# Patient Record
Sex: MALE | Race: WHITE | NOT HISPANIC OR LATINO | Employment: OTHER | URBAN - METROPOLITAN AREA
[De-identification: names, ages, dates, MRNs, and addresses within clinical notes are randomized per-mention and may not be internally consistent; named-entity substitution may affect disease eponyms.]

---

## 2018-10-02 ENCOUNTER — TRANSFERRED RECORDS (OUTPATIENT)
Dept: HEALTH INFORMATION MANAGEMENT | Facility: CLINIC | Age: 63
End: 2018-10-02

## 2018-10-05 ENCOUNTER — TRANSFERRED RECORDS (OUTPATIENT)
Dept: HEALTH INFORMATION MANAGEMENT | Facility: CLINIC | Age: 63
End: 2018-10-05

## 2018-10-10 ENCOUNTER — TRANSFERRED RECORDS (OUTPATIENT)
Dept: HEALTH INFORMATION MANAGEMENT | Facility: CLINIC | Age: 63
End: 2018-10-10

## 2018-10-11 ENCOUNTER — TRANSFERRED RECORDS (OUTPATIENT)
Dept: HEALTH INFORMATION MANAGEMENT | Facility: CLINIC | Age: 63
End: 2018-10-11

## 2018-10-15 ENCOUNTER — TRANSFERRED RECORDS (OUTPATIENT)
Dept: HEALTH INFORMATION MANAGEMENT | Facility: CLINIC | Age: 63
End: 2018-10-15

## 2018-10-15 ENCOUNTER — PRE VISIT (OUTPATIENT)
Dept: RADIATION ONCOLOGY | Facility: CLINIC | Age: 63
End: 2018-10-15

## 2018-10-15 NOTE — TELEPHONE ENCOUNTER
Date of appointment: 10/16/18   Diagnosis/reason for appointment:Rectal Cancer  Referring provider/facility: Dr. Jiménez  Who called: Pool message    Recent Studies  Imaging:-CT -[facility]-requested  Pathology: [facility]  Labs:  Previous radiation (if known):No    Records requested from:  Records received from:    Additional information:

## 2018-10-15 NOTE — PROGRESS NOTES
RADIATION ONCOLOGY CONSULT NOTE    Date of Visit: Oct 16, 2018  Patient Name: Cecil Mejia  MRN: [id number]  : 1955    Cecil Mejia is being seen today for initial consultation at the request of Dr. Amado Jiménez for consideration of radiation therapy.    HISTORY OF PRESENT ILLNESS:  Mr. Mejia is a 63 year old male with rectal cancer.    Home cologuard test was positive.    10/2/18: Screening colonoscopy showed a circumferential friable mass in the rectosigmoid colon, 13 cm from the anal verge. Biopsy showed moderately differentiated adenocarcinoma, MMR intact. There was also a tubular adenoma that was completely resected.    10/5/18: CT c/a/p showed no metastatic disease, several small sub-cm pulmonary nodules.    10/11/18: EUS showed a 3 cm mass, from 12-15 cm from the anal verge, extending through the muscularis propria, with 2 8x5 mm oval hypoechoic LN adjacent to the tumor; uT3N1b.    MRI pelvis showed a 6 cm mass located 13 cm from the anal verge, extending through the muscularis propria into the mesorectal fat, with a few mesorectal LN measuring up to 6 mm.    10/12/18: He was seen by Dr. Stokes in surgical oncology, who discussed management with neoadjuvant chemoradiation followed by likely low anterior resection and systemic chemotherapy. She also discussed a diverting ileostomy at the time of surgery.    10/15/18: He was seen by Dr. Jiménez at [facility], who discussed treatment with capecitabine during pelvic radiation, and adjuvant FOLFOX after surgery.    Today, he states he is doing quite well. He sometimes has constipation and takes OTC stool softeners for this as needed. He otherwise denies any symptoms, including pain with BM, hematochezia, small stool caliber, or tenesmus. He has a good appetite and energy level, he has no weight loss. He is retired and cares for his wife who is in a wheelchair due to transverse myelitis.    CHEMOTHERAPY HISTORY: none    RADIATION  "THERAPY HISTORY:  none    PAST MEDICAL/SURGICAL HISTORY:  Past Medical History:   Diagnosis Date     Hypertension      Rectal cancer (H) 10/02/2018     Past Surgical History:   Procedure Laterality Date     BIOPSY  10/02/2018    Colon, Rectosigmoid, Mass Biopsy     COLONOSCOPY  10/02/2018     LEFT ARM SURGERY RELATED TO BREAK Left 1995       ALLERGIES:  Allergies as of 10/16/2018 - Hakeem as Reviewed 10/16/2018   Allergen Reaction Noted     Lisinopril Cough 03/11/2016       MEDICATIONS:  Current Outpatient Prescriptions   Medication Sig Dispense Refill     amLODIPine (NORVASC) 10 MG tablet Take 10 mg by mouth       losartan (COZAAR) 100 MG tablet Take 100 mg by mouth       Multiple Vitamin (MULTI-VITAMINS) TABS Take 3 tablets by mouth          FAMILY HISTORY:  Family History   Problem Relation Age of Onset     Cancer Father      Multiple Myeloma     Lymphoma Sister      Breast Cancer Sister        SOCIAL HISTORY:  Social History     Social History     Marital status:      Spouse name: N/A     Number of children: N/A     Years of education: N/A     Occupational History     Not on file.     Social History Main Topics     Smoking status: Never Smoker     Smokeless tobacco: Never Used     Alcohol use Yes      Comment: Patient reports 1-2 beer per days     Drug use: No     Sexual activity: Not on file     Other Topics Concern     Not on file     Social History Narrative     No narrative on file       REVIEW OF SYSTEMS: A 10-point review of systems was obtained. Pertinent findings are noted in the HPI and are otherwise unremarkable.     PHYSICAL EXAM:  VITALS: /86 (BP Location: Left arm, Patient Position: Chair, Cuff Size: Adult Regular)  Pulse 72  Temp 98  F (36.7  C) (Oral)  Resp 16  Ht 5' 8\"  Wt 157 lb 11.2 oz  SpO2 96%  BMI 23.98 kg/m2  GEN: appears well, in no acute distress  HEENT: normocephalic and atraumatic, EOMI, anicteric sclerae  CV: RRR, no m/g/r, no LE edema, no JVD  RESP: CTAB, normal " respiration on room air, no stridor  ABDOMEN: soft, NT, ND  SKIN: normal color and turgor  MSK: moving all extremities well  NEURO: CN II-XII grossly intact, no focal neurologic deficit  PSYCH: appropriate mood, affect, and judgment    CEA: 7.3    ECOG PERFORMANCE STATUS: 0    All pertinent laboratory, imaging, and pathology findings have been reviewed.     IMPRESSION AND RECOMMENDATIONS:  In summary, Mr. Mejia is a 63 year old male with upper rectal adenocarcinoma, [id number], by EUS and MRI.     We discussed the natural history of his disease. Due to his locally advanced disease, neoadjuvant radiation therapy would be recommended, as it has been shown to reduce the risk of local recurrence. After completion of radiation therapy, he would undergo total mesorectal excision and likely adjuvant systemic therapy. I discussed the logistics of radiation therapy, which would be delivered daily, Monday through Friday, for approximately 5-1/2 weeks. Typically, oral capecitabine or 5-FU is given concurrently with radiation therapy as radiosensitizer.    The risks, benefits, alternatives, and logistics to radiation therapy were discussed in detail. Side effects of radiation therapy may include, but are not limited to, loose stools or diarrhea, rectal urgency or incontinence, urinary symptoms such as frequency, urgency, and nocturia, nausea and vomiting, fatigue, and skin reaction and peeling; as well as late side effects such as bowel obstruction or perforation, injury to the rectum or bladder resulting in bleeding, and second malignancy. He is aware that the side effects of radiation therapy may be severe and permanent, although we expect that such risks would be low and that they are outweighed by the benefit of treatment. He was given the opportunity to ask questions, which were answered.     After this discussion, he agreed to proceed with neoadjuvant chemoradiation. However, he is considering having radiation at  [facility], if it is closer to him (he has some limitations on how long he can be away from home due to caring for his wife). He is going to figure out which would be easier for him, and let us know. If he decides to have treatment here, we will do CT simulation as soon as possible, and hopefully start chemoRT within a week after that. Informed consent was obtained.    Brent Orantes M.D.  Attending Physician  Radiation Oncology  Pager #[phone]

## 2018-10-15 NOTE — TELEPHONE ENCOUNTER
CT done at [facility] in [place]-Requested    Pelvis/Rectum MR-Requested from [facility]

## 2018-10-15 NOTE — TELEPHONE ENCOUNTER
Date of appointment: 10/16/18   Diagnosis/reason for appointment: Rectal Cancer  Referring provider/facility: Dr. Jiménez  Who called: Pool message from [facility]    Recent Studies  Imaging:  Pathology:  Labs:  Previous chemo/radiation (if known):    Records requested from:  Records received from:    Additional information:

## 2018-10-16 ENCOUNTER — OFFICE VISIT (OUTPATIENT)
Dept: RADIATION ONCOLOGY | Facility: CLINIC | Age: 63
End: 2018-10-16
Payer: COMMERCIAL

## 2018-10-16 VITALS
SYSTOLIC BLOOD PRESSURE: 137 MMHG | TEMPERATURE: 98 F | OXYGEN SATURATION: 96 % | HEART RATE: 72 BPM | BODY MASS INDEX: 23.9 KG/M2 | RESPIRATION RATE: 16 BRPM | WEIGHT: 157.7 LBS | HEIGHT: 68 IN | DIASTOLIC BLOOD PRESSURE: 86 MMHG

## 2018-10-16 DIAGNOSIS — C20 MALIGNANT TUMOR OF RECTUM (H): Primary | ICD-10-CM

## 2018-10-16 PROCEDURE — 99204 OFFICE O/P NEW MOD 45 MIN: CPT | Performed by: RADIOLOGY

## 2018-10-16 RX ORDER — LOSARTAN POTASSIUM 100 MG/1
100 TABLET ORAL
COMMUNITY
Start: 2018-09-20

## 2018-10-16 RX ORDER — AMLODIPINE BESYLATE 10 MG/1
10 TABLET ORAL
COMMUNITY
Start: 2018-09-20

## 2018-10-16 RX ORDER — DIPHENOXYLATE HYDROCHLORIDE AND ATROPINE SULFATE 2.5; .025 MG/1; MG/1
3 TABLET ORAL
COMMUNITY
Start: 2014-12-01

## 2018-10-16 ASSESSMENT — PAIN SCALES - GENERAL: PAINLEVEL: NO PAIN (0)

## 2018-10-16 NOTE — NURSING NOTE
"INITIAL PATIENT ASSESSMENT      Diagnosis: Rectal cancer    Prior radiation therapy: None    Prior chemotherapy: None    Prior hormonal therapy:No    Pain Eval:  Denies    Psychosocial  Living arrangements: Lives at home in [place] with spouse  Fall Risk: independent   referral needs: Not needed    Advanced Directive: No, patient declines information packet  Implantable Cardiac Device: No  Authorization To Share Protected Health Information: Yes, form completed during clinic visit today, 10/16/2018, sent to HIM for scanning into medical chart      Reproductive note: Not recorded for male patient      Review of Systems   Constitutional: Negative.    HENT: Negative.    Eyes: Negative.    Respiratory: Negative.    Cardiovascular: Negative.    Gastrointestinal: Negative.         Patient reports \"soft\" bowel movements with use of Jeanette tablets, patient reports \"I'm afraid to be constipated\".   Genitourinary: Negative.    Musculoskeletal: Negative.    Skin: Negative.    Neurological: Negative.    Endo/Heme/Allergies: Negative.    Psychiatric/Behavioral: Negative.        Nurse face-to-face time: Level 5:  over 15 min face to face time    "

## 2018-10-16 NOTE — MR AVS SNAPSHOT
After Visit Summary   10/16/2018    Cecil Mejia    MRN: [id number]           Patient Information     Date Of Birth          1955        Visit Information        Provider Department      10/16/2018 1:00 PM Brent Orantes MD [facility]        Today's Diagnoses     Malignant tumor of rectum (H)    -  1      Care Instructions          What to expect at your Simulation visit:    You will meet with a Radiation Therapist and other team members who will be doing a planning session called a  simulation  with you. This process will determine your daily treatment.    ~ You will lie on a flat table and have a treatment planning CT scan.  It is important during the scan to hold very still and breathe normally.    ~ Your therapist may construct a body mold to help you hold still for your treatments.    ~ If you are having treatment to the head or neck area you will be fitted with a plastic mesh mask that fits very snugly over your face and neck.     ~ Your therapist will be taking some digital photos that will go in your treatment chart.      ~Your therapist will make marks on your skin and take measurements. Your therapist may ask you about making small tattoos (a permanent small dot) over these marks.  These marks are used to position you daily for your radiation therapy treatments. Please do not wash off any marks until all of your radiation therapy treatments are complete unless you are instructed to do so by your therapist.    ~ Once the simulation is completed it can take from 3 to 10 business days before you start radiation therapy treatments.    ~ You may meet with a nurse who will go over management of treatment side effects and self care during your treatments. The nurse will help to plan care with other departments and physicians if needed.      [facility] contact [facility] RN with questions or concerns following today's appointment: [phone].    Thank  "you!            Follow-ups after your visit        Who to contact     If you have questions or need follow up information about today's clinic visit or your schedule please contact [facility] directly at [phone].  Normal or non-critical lab and imaging results will be communicated to you by MyChart, letter or phone within 4 business days after the clinic has received the results. If you do not hear from us within 7 days, please contact the clinic through MyChart or phone. If you have a critical or abnormal lab result, we will notify you by phone as soon as possible.  Submit refill requests through [employer] or call your pharmacy and they will forward the refill request to us. Please allow 3 business days for your refill to be completed.          Additional Information About Your Visit        Care EveryWhere ID     This is your Care EveryWhere ID. This could be used by other organizations to access your [facility] medical records  [id number]        Your Vitals Were     Pulse Temperature Respirations Height Pulse Oximetry BMI (Body Mass Index)    72 98  F (36.7  C) (Oral) 16 5' 8\" 96% 23.98 kg/m2       Blood Pressure from Last 3 Encounters:   10/16/18 137/86    Weight from Last 3 Encounters:   10/16/18 157 lb 11.2 oz              Today, you had the following     No orders found for display       Primary Care Provider Office Phone # Fax #    Torin Charles [phone]       [facility] [address]  [facility] [address]        Equal Access to Services     KYLE ROMO : Hadii maritza rauscho Soelvira, waaxda luqadaha, qaybta kaalmada zackaryyada, yeison victoria . So [facility] [phone].    ATENCIÓN: Si habla español, tiene a roberts disposición servicios gratuitos de asistencia lingüística. Livan al [phone].    We comply with applicable federal civil rights laws and [place] laws. We do not discriminate on the basis of race, color, national " origin, age, disability, sex, sexual orientation, or gender identity.            Thank you!     Thank you for choosing [facility]  for your care. Our goal is always to provide you with excellent care. Hearing back from our patients is one way we can continue to improve our services. Please take a few minutes to complete the written survey that you may receive in the mail after your visit with us. Thank you!             Your Updated Medication List - Protect others around you: Learn how to safely use, store and throw away your medicines at www.disposemymeds.org.          This list is accurate as of 10/16/18  2:30 PM.  Always use your most recent med list.                   Brand Name Dispense Instructions for use Diagnosis    amLODIPine 10 MG tablet    NORVASC     Take 10 mg by mouth        losartan 100 MG tablet    COZAAR     Take 100 mg by mouth        MULTI-VITAMINS Tabs      Take 3 tablets by mouth

## 2018-10-16 NOTE — PATIENT INSTRUCTIONS
What to expect at your Simulation visit:    You will meet with a Radiation Therapist and other team members who will be doing a planning session called a  simulation  with you. This process will determine your daily treatment.    ~ You will lie on a flat table and have a treatment planning CT scan.  It is important during the scan to hold very still and breathe normally.    ~ Your therapist may construct a body mold to help you hold still for your treatments.    ~ If you are having treatment to the head or neck area you will be fitted with a plastic mesh mask that fits very snugly over your face and neck.     ~ Your therapist will be taking some digital photos that will go in your treatment chart.      ~Your therapist will make marks on your skin and take measurements. Your therapist may ask you about making small tattoos (a permanent small dot) over these marks.  These marks are used to position you daily for your radiation therapy treatments. Please do not wash off any marks until all of your radiation therapy treatments are complete unless you are instructed to do so by your therapist.    ~ Once the simulation is completed it can take from 3 to 10 business days before you start radiation therapy treatments.    ~ You may meet with a nurse who will go over management of treatment side effects and self care during your treatments. The nurse will help to plan care with other departments and physicians if needed.      Please contact [facility] RN with questions or concerns following today's appointment: [phone].    Thank you!

## 2018-10-16 NOTE — LETTER
10/16/2018         RE: Cecil Mejia  [address]  [place] [address]        Dear Colleague,    Thank you for referring your patient, Cecil Mejia, to the [facility] [[email or web address] Please see a copy of my visit note below.    RADIATION ONCOLOGY CONSULT NOTE    Date of Visit: Oct 16, 2018  Patient Name: Cecil Mejia  MRN: [id number]  : 1955    Cecil Mejia is being seen today for initial consultation at the request of Dr. Amado Jiménez for consideration of radiation therapy.    HISTORY OF PRESENT ILLNESS:  Mr. Mejia is a 63 year old male with rectal cancer.    Home cologuard test was positive.    10/2/18: Screening colonoscopy showed a circumferential friable mass in the rectosigmoid colon, 13 cm from the anal verge. Biopsy showed moderately differentiated adenocarcinoma, MMR intact. There was also a tubular adenoma that was completely resected.    10/5/18: CT c/a/p showed no metastatic disease, several small sub-cm pulmonary nodules.    10/11/18: EUS showed a 3 cm mass, from 12-15 cm from the anal verge, extending through the muscularis propria, with 2 8x5 mm oval hypoechoic LN adjacent to the tumor; uT3N1b.    MRI pelvis showed a 6 cm mass located 13 cm from the anal verge, extending through the muscularis propria into the mesorectal fat, with a few mesorectal LN measuring up to 6 mm.    10/12/18: He was seen by Dr. Stokes in surgical oncology, who discussed management with neoadjuvant chemoradiation followed by likely low anterior resection and systemic chemotherapy. She also discussed a diverting ileostomy at the time of surgery.    10/15/18: He was seen by Dr. Jiménez at [facility], who discussed treatment with capecitabine during pelvic radiation, and adjuvant FOLFOX after surgery.    Today, he states he is doing quite well. He sometimes has constipation and takes OTC stool softeners for this as needed. He otherwise denies any symptoms, including pain with BM,  hematochezia, small stool caliber, or tenesmus. He has a good appetite and energy level, he has no weight loss. He is retired and cares for his wife who is in a wheelchair due to transverse myelitis.    CHEMOTHERAPY HISTORY: none    RADIATION THERAPY HISTORY:  none    PAST MEDICAL/SURGICAL HISTORY:  Past Medical History:   Diagnosis Date     Hypertension      Rectal cancer (H) 10/02/2018     Past Surgical History:   Procedure Laterality Date     BIOPSY  10/02/2018    Colon, Rectosigmoid, Mass Biopsy     COLONOSCOPY  10/02/2018     LEFT ARM SURGERY RELATED TO BREAK Left 1995       ALLERGIES:  Allergies as of 10/16/2018 - Hakeem as Reviewed 10/16/2018   Allergen Reaction Noted     Lisinopril Cough 03/11/2016       MEDICATIONS:  Current Outpatient Prescriptions   Medication Sig Dispense Refill     amLODIPine (NORVASC) 10 MG tablet Take 10 mg by mouth       losartan (COZAAR) 100 MG tablet Take 100 mg by mouth       Multiple Vitamin (MULTI-VITAMINS) TABS Take 3 tablets by mouth          FAMILY HISTORY:  Family History   Problem Relation Age of Onset     Cancer Father      Multiple Myeloma     Lymphoma Sister      Breast Cancer Sister        SOCIAL HISTORY:  Social History     Social History     Marital status:      Spouse name: N/A     Number of children: N/A     Years of education: N/A     Occupational History     Not on file.     Social History Main Topics     Smoking status: Never Smoker     Smokeless tobacco: Never Used     Alcohol use Yes      Comment: Patient reports 1-2 beer per days     Drug use: No     Sexual activity: Not on file     Other Topics Concern     Not on file     Social History Narrative     No narrative on file       REVIEW OF SYSTEMS: A 10-point review of systems was obtained. Pertinent findings are noted in the HPI and are otherwise unremarkable.     PHYSICAL EXAM:  VITALS: /86 (BP Location: Left arm, Patient Position: Chair, Cuff Size: Adult Regular)  Pulse 72  Temp 98  F (36.7  C)  "(Oral)  Resp 16  Ht 5' 8\"  Wt 157 lb 11.2 oz  SpO2 96%  BMI 23.98 kg/m2  GEN: appears well, in no acute distress  HEENT: normocephalic and atraumatic, EOMI, anicteric sclerae  CV: RRR, no m/g/r, no LE edema, no JVD  RESP: CTAB, normal respiration on room air, no stridor  ABDOMEN: soft, NT, ND  SKIN: normal color and turgor  MSK: moving all extremities well  NEURO: CN II-XII grossly intact, no focal neurologic deficit  PSYCH: appropriate mood, affect, and judgment    CEA: 7.3    ECOG PERFORMANCE STATUS: 0    All pertinent laboratory, imaging, and pathology findings have been reviewed.     IMPRESSION AND RECOMMENDATIONS:  In summary, Mr. Mejia is a 63 year old male with upper rectal adenocarcinoma, [id number], by EUS and MRI.     We discussed the natural history of his disease. Due to his locally advanced disease, neoadjuvant radiation therapy would be recommended, as it has been shown to reduce the risk of local recurrence. After completion of radiation therapy, he would undergo total mesorectal excision and likely adjuvant systemic therapy. I discussed the logistics of radiation therapy, which would be delivered daily, Monday through Friday, for approximately 5-1/2 weeks. Typically, oral capecitabine or 5-FU is given concurrently with radiation therapy as radiosensitizer.    The risks, benefits, alternatives, and logistics to radiation therapy were discussed in detail. Side effects of radiation therapy may include, but are not limited to, loose stools or diarrhea, rectal urgency or incontinence, urinary symptoms such as frequency, urgency, and nocturia, nausea and vomiting, fatigue, and skin reaction and peeling; as well as late side effects such as bowel obstruction or perforation, injury to the rectum or bladder resulting in bleeding, and second malignancy. He is aware that the side effects of radiation therapy may be severe and permanent, although we expect that such risks would be low and that they are " outweighed by the benefit of treatment. He was given the opportunity to ask questions, which were answered.     After this discussion, he agreed to proceed with neoadjuvant chemoradiation. However, he is considering having radiation at [facility], if it is closer to him (he has some limitations on how long he can be away from home due to caring for his wife). He is going to figure out which would be easier for him, and let us know. If he decides to have treatment here, we will do CT simulation as soon as possible, and hopefully start chemoRT within a week after that. Informed consent was obtained.    Brent Orantes M.D.  Attending Physician  Radiation Oncology  Pager #[phone]        Again, thank you for allowing me to participate in the care of your patient.        Sincerely,        Brent Orantes MD

## 2018-10-22 ENCOUNTER — CARE COORDINATION (OUTPATIENT)
Dept: RADIATION ONCOLOGY | Facility: CLINIC | Age: 63
End: 2018-10-22

## 2018-10-22 NOTE — PROGRESS NOTES
Received telephone message on 10/18/2018 from patient, this RN was out of clinic.  Patient reports that he reviewed radiation treatment with his insurance company and found that [facility] and [facility] is out of network with his [employer] insurance.  He reports that [facility] through [employer] is in network and due to this he will be proceeding with treatment at [facility].      This RN contacted patient today, 10/22/2018 upon return to clinic.  Patient confirms above information.  He reports that Dr. Jiménez is aware and his team assisted with consultation visit with Dr. Keys at [facility] on Friday, 10/26/2018.  Patient expressed appreciation of contact and care and had no questions at this time.    Dr. Orantes updated.    Rosita Sheth, RN BSN OCN